# Patient Record
Sex: MALE | Race: WHITE | NOT HISPANIC OR LATINO | Employment: FULL TIME | URBAN - METROPOLITAN AREA
[De-identification: names, ages, dates, MRNs, and addresses within clinical notes are randomized per-mention and may not be internally consistent; named-entity substitution may affect disease eponyms.]

---

## 2023-05-25 ENCOUNTER — OFFICE VISIT (OUTPATIENT)
Dept: OPTOMETRY | Facility: CLINIC | Age: 28
End: 2023-05-25
Payer: OTHER MISCELLANEOUS

## 2023-05-25 DIAGNOSIS — S05.02XA ABRASION OF LEFT CORNEA, INITIAL ENCOUNTER: Primary | ICD-10-CM

## 2023-05-25 PROCEDURE — 92002 INTRM OPH EXAM NEW PATIENT: CPT | Performed by: OPTOMETRIST

## 2023-05-25 RX ORDER — TOBRAMYCIN 3 MG/ML
1-2 SOLUTION/ DROPS OPHTHALMIC 4 TIMES DAILY
Qty: 5 ML | Refills: 0 | Status: SHIPPED | OUTPATIENT
Start: 2023-05-25 | End: 2023-05-28

## 2023-05-25 RX ORDER — MOXIFLOXACIN 5 MG/ML
1 SOLUTION/ DROPS OPHTHALMIC 3 TIMES DAILY
Qty: 3 ML | Refills: 0 | Status: CANCELLED | OUTPATIENT
Start: 2023-05-25 | End: 2023-05-28

## 2023-05-25 ASSESSMENT — VISUAL ACUITY
OD_CC: 20/20
CORRECTION_TYPE: GLASSES
OS_CC: 20/20
METHOD: SNELLEN - LINEAR

## 2023-05-25 ASSESSMENT — EXTERNAL EXAM - RIGHT EYE: OD_EXAM: NORMAL

## 2023-05-25 ASSESSMENT — SLIT LAMP EXAM - LIDS: COMMENTS: NORMAL

## 2023-05-25 ASSESSMENT — TONOMETRY
OS_IOP_MMHG: 10
IOP_METHOD: APPLANATION

## 2023-05-25 ASSESSMENT — EXTERNAL EXAM - LEFT EYE: OS_EXAM: NORMAL

## 2023-05-25 NOTE — LETTER
REPORT OF WORK COMP   PATIENT DATA    Employee Name: Darwin Dos Santos      : 1995     #: [id number]    Work related injury: Yes  Employer at time of injury:    Employer contact & phone:    Employed elsewhere? Unknown  Workers' Compensation Carrier/Managed Care Plan:       Today's date: 2023  Date of injury: 2023  Date of first visit: 2023      PROVIDER EVALUATION: Please fill in as needed.  Please give copy to employee for employer.    1. Diagnosis: Corneal abrasion left eye  2. Treatment: Eye drops.  3. Medication: Tobramycin  NOTE: When ordering a medication, [place] Rules require Work Comp or WC on prescriptions.    Ok to work today if comfortable- eye protection/sun protection.     RESTRICTIONS: Unlimited unless listed.  Restrictions apply to home and leisure also.  If work restrictions is not available, the employee is totally disabled.       Any Permanent Partial Disability? 0%     Medical Examiner: Thomas Pena OD            License or registration: [id number]    Next appointment: As needed    CC: Employer, Managed Care Plan/Payor, Patient

## 2023-05-25 NOTE — PATIENT INSTRUCTIONS
Tobramycin- 1 drop left eye 4 x day for 3 days.    Artificial tears- 1 drop 2-4 x daily.  Systane or Refresh.    Return if signs/symptoms worsen or do not improve.    Thomas Pena, OD

## 2023-05-25 NOTE — PROGRESS NOTES
Chief Complaint   Patient presents with     Eye Problem Left Eye     Works at a [employer].  This morning at 7 am felt something in eye.  Coworker rinsed eye with Saline for about 30 seconds.  Pain started as a 1- then driving here pain increased to a 5.  There was no shattered glass this am or nothing specific that he got in his eye.      Medical, surgical and family histories reviewed and updated 5/25/2023.       OBJECTIVE: See Ophthalmology exam    ASSESSMENT:    ICD-10-CM    1. Abrasion of left cornea, initial encounter  S05.02XA tobramycin (TOBREX) 0.3 % ophthalmic solution         PLAN:     Patient Instructions   Tobramycin- 1 drop left eye 4 x day for 3 days.    Artificial tears- 1 drop 2-4 x daily.  Systane or Refresh.    Return if signs/symptoms worsen or do not improve.    Thomas Pena, OD

## 2023-05-25 NOTE — LETTER
5/25/2023         RE: Darwin Dos Santos  [address] [facility] [address]        Dear Colleague,    Thank you for referring your patient, Darwin Dos Santos, to the [facility]. Please see a copy of my visit note below.    Chief Complaint   Patient presents with     Eye Problem Left Eye     Works at a [employer].  This morning at 7 am felt something in eye.  Coworker rinsed eye with Saline for about 30 seconds.  Pain started as a 1- then driving here pain increased.  There was no shattered glass this am or nothing specific that he got in his eye.      Medical, surgical and family histories reviewed and updated 5/25/2023.       OBJECTIVE: See Ophthalmology exam    ASSESSMENT:    ICD-10-CM    1. Abrasion of left cornea, initial encounter  S05.02XA tobramycin (TOBREX) 0.3 % ophthalmic solution         PLAN:     Patient Instructions   Tobramycin- 1 drop left eye 4 x day for 3 days.    Artificial tears- 1 drop 2-4 x daily.  Systane or Refresh.    Return if signs/symptoms worsen or do not improve.    Thomas Pena, OD             Again, thank you for allowing me to participate in the care of your patient.        Sincerely,        Thomas Pena, OD